# Patient Record
Sex: MALE | URBAN - METROPOLITAN AREA
[De-identification: names, ages, dates, MRNs, and addresses within clinical notes are randomized per-mention and may not be internally consistent; named-entity substitution may affect disease eponyms.]

---

## 2020-09-14 ENCOUNTER — APPOINTMENT (OUTPATIENT)
Dept: URBAN - METROPOLITAN AREA CLINIC 186 | Age: 25
Setting detail: DERMATOLOGY
End: 2020-09-14

## 2020-09-14 VITALS — TEMPERATURE: 97.8 F

## 2020-09-14 DIAGNOSIS — L64.8 OTHER ANDROGENIC ALOPECIA: ICD-10-CM

## 2020-09-14 DIAGNOSIS — R21 RASH AND OTHER NONSPECIFIC SKIN ERUPTION: ICD-10-CM

## 2020-09-14 PROCEDURE — 87220 TISSUE EXAM FOR FUNGI: CPT

## 2020-09-14 PROCEDURE — OTHER MIPS QUALITY: OTHER

## 2020-09-14 PROCEDURE — OTHER KOH PREP: OTHER

## 2020-09-14 PROCEDURE — OTHER COUNSELING: OTHER

## 2020-09-14 PROCEDURE — 99242 OFF/OP CONSLTJ NEW/EST SF 20: CPT

## 2020-09-14 PROCEDURE — OTHER DIAGNOSIS COMMENT: OTHER

## 2020-09-14 PROCEDURE — OTHER EDUCATIONAL RESOURCES PROVIDED: OTHER

## 2020-09-14 PROCEDURE — OTHER TREATMENT REGIMEN: OTHER

## 2020-09-14 PROCEDURE — OTHER ADDITIONAL NOTES: OTHER

## 2020-09-14 PROCEDURE — OTHER DEFER: OTHER

## 2020-09-14 ASSESSMENT — LOCATION SIMPLE DESCRIPTION DERM
LOCATION SIMPLE: LEFT FOREHEAD
LOCATION SIMPLE: RIGHT FOREHEAD
LOCATION SIMPLE: POSTERIOR SCALP
LOCATION SIMPLE: DORSAL PENIS

## 2020-09-14 ASSESSMENT — LOCATION DETAILED DESCRIPTION DERM
LOCATION DETAILED: POSTERIOR MID-PARIETAL SCALP
LOCATION DETAILED: RIGHT SUPERIOR LATERAL FOREHEAD
LOCATION DETAILED: LEFT SUPERIOR FOREHEAD
LOCATION DETAILED: RIGHT SUPERIOR FOREHEAD
LOCATION DETAILED: RIGHT LATERAL DISTAL DORSAL PENILE SHAFT
LOCATION DETAILED: LEFT SUPERIOR LATERAL FOREHEAD

## 2020-09-14 ASSESSMENT — LOCATION ZONE DERM
LOCATION ZONE: FACE
LOCATION ZONE: PENIS
LOCATION ZONE: SCALP

## 2020-09-14 ASSESSMENT — BSA RASH: BSA RASH: 1

## 2020-09-14 NOTE — HPI: RASH
What Type Of Note Output Would You Prefer (Optional)?: Bullet Format
How Severe Is Your Rash?: mild
Is This A New Presentation, Or A Follow-Up?: Referral for Rash
Additional History: Patient states his primary care physician prescribed anti fungals and antibiotics after doing a culture.
Who Is Your Referring Provider?: Dr. Padilla

## 2020-09-14 NOTE — PROCEDURE: TREATMENT REGIMEN
Plan: Denies family history of psoriasis, arthritis, he states he doesn’t have any affected areas on his elbows or knees. Patient has been using ketoconazole cream 2% , fluconazole 200 mg, Bactrim. He has finished his dosage of fluconazole and Bactrim. He does have triamcinolone 0.1% cream for his hands that he has been applying. Patient states his girlfriend did have a break out of molluscum contagiosum, denies any bumps. Recommended a punch biopsy to determine exactly what rash this is, but only if the area is flared for best path results.\\n\\nReassured patient this rash doesn’t look like a STD
Detail Level: Zone
Otc Regimen: Minoxidil
Continue Regimen: Finasteride
Discontinue Regimen: Ketoconazole 2% cream \\nTriamcinolone 0.1% cream
Initiate Treatment: Apply thin film of Cloderm twice daily for ten days, then use as maintenance once or twice weekly
Samples Given: Cloderm
Plan: Patient has only been taking Finasteride for 45 days, he has treated minoxidil and didn’t see a difference. Advised patient it takes up to 6 months to see results. Discussed low level light therapy vs PRP

## 2020-09-14 NOTE — PROCEDURE: KOH PREP
Showing: fungal hyphal elements: negative
Koh Procedure Text (Tissue Harvesting Technique): A glass slide was used to scrape the skin. The skin scrapings were placed on a glass slide, covered with a coverslip and a KOH solution was applied.
Koh Intro Text (From The.....): A KOH prep was ordered and evaluated from the
Detail Level: Zone

## 2020-09-14 NOTE — PROCEDURE: ADDITIONAL NOTES
Detail Level: Simple
Additional Notes: Referral correspondence to primary care physician, Dr ARCHIE NICOLE

## 2020-09-14 NOTE — PROCEDURE: DIAGNOSIS COMMENT
Comment: Beronica type 4.  He denies any family history of however has a typical male pattern I suggested compliance with 6 months of minoxidil  or finasteride or both.
Detail Level: Detailed
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  He denies any improvement with both topical and oral antifungals, Bactrim, or short court of over the counter hydrocortisone. \\nHe has no rashes elsewhere. Denies any over the counter meds . Ddx includes genital psoriasis, fixed drug reaction. He declined punch biopsy today.  I suggested treatment with low pendency topical steroid, cloderm cream samples were given.  He agrees if this is not improved will proceed with biopsy.

## 2020-10-12 ENCOUNTER — APPOINTMENT (OUTPATIENT)
Dept: URBAN - METROPOLITAN AREA CLINIC 186 | Age: 25
Setting detail: DERMATOLOGY
End: 2020-10-12

## 2021-08-23 ENCOUNTER — APPOINTMENT (OUTPATIENT)
Dept: URBAN - METROPOLITAN AREA CLINIC 186 | Age: 26
Setting detail: DERMATOLOGY
End: 2021-08-23

## 2021-08-23 DIAGNOSIS — R21 RASH AND OTHER NONSPECIFIC SKIN ERUPTION: ICD-10-CM

## 2021-08-23 PROCEDURE — OTHER PRESCRIPTION: OTHER

## 2021-08-23 PROCEDURE — OTHER ADDITIONAL NOTES: OTHER

## 2021-08-23 PROCEDURE — OTHER DIAGNOSIS COMMENT: OTHER

## 2021-08-23 PROCEDURE — OTHER PRESCRIPTION MEDICATION MANAGEMENT: OTHER

## 2021-08-23 PROCEDURE — OTHER DEFER: OTHER

## 2021-08-23 PROCEDURE — 11104 PUNCH BX SKIN SINGLE LESION: CPT

## 2021-08-23 PROCEDURE — OTHER EDUCATIONAL RESOURCES PROVIDED: OTHER

## 2021-08-23 PROCEDURE — OTHER COUNSELING: OTHER

## 2021-08-23 PROCEDURE — 99214 OFFICE O/P EST MOD 30 MIN: CPT | Mod: 25

## 2021-08-23 PROCEDURE — OTHER BIOPSY BY PUNCH METHOD: OTHER

## 2021-08-23 RX ORDER — CALCIPOTRIENE 50 UG/G
AEROSOL, FOAM TOPICAL
Qty: 60 | Refills: 0 | Status: ERX | COMMUNITY
Start: 2021-08-23

## 2021-08-23 RX ORDER — CLOBETASOL PROPIONATE 0.5 MG/G
CREAM TOPICAL
Qty: 30 | Refills: 2 | Status: ERX | COMMUNITY
Start: 2021-08-23

## 2021-08-23 ASSESSMENT — LOCATION SIMPLE DESCRIPTION DERM
LOCATION SIMPLE: LEFT INDEX FINGER
LOCATION SIMPLE: DORSAL PENIS
LOCATION SIMPLE: RIGHT HAND
LOCATION SIMPLE: LEFT HAND

## 2021-08-23 ASSESSMENT — LOCATION DETAILED DESCRIPTION DERM
LOCATION DETAILED: LEFT PROXIMAL DORSAL INDEX FINGER
LOCATION DETAILED: RIGHT ULNAR DORSAL HAND
LOCATION DETAILED: RIGHT LATERAL DISTAL DORSAL PENILE SHAFT
LOCATION DETAILED: LEFT ULNAR DORSAL HAND

## 2021-08-23 ASSESSMENT — LOCATION ZONE DERM
LOCATION ZONE: PENIS
LOCATION ZONE: FINGER
LOCATION ZONE: HAND

## 2021-08-23 NOTE — PROCEDURE: PRESCRIPTION MEDICATION MANAGEMENT
Samples Given: Sorilux foam
Detail Level: Zone
Initiate Treatment: Clobetasol cream
Discontinue Regimen: Cloderm cream (no relief)
Initiate Treatment: Sorilux foam twice daily
Render In Strict Bullet Format?: No
Discontinue Regimen: Triamcinolone cream

## 2021-08-23 NOTE — PROCEDURE: COUNSELING
Topical Steroid Recommendations: He was using over the counter hydrocortisone, I suggested he continue this as needed
Detail Level: Detailed

## 2021-08-23 NOTE — PROCEDURE: DIAGNOSIS COMMENT
Detail Level: Detailed
Render Risk Assessment In Note?: no
Comment: chronic papulosquamous patch, patient denies itch.  He doesnt recall any clearance with cloderm cream given at his last visit.  no rashes on scalp except for fine papules on hands.

## 2021-08-23 NOTE — PROCEDURE: DEFER
Introduction Text (Please End With A Colon): The following procedure has been deferred:
Procedure To Be Performed At Next Visit: Biopsy by punch method
Detail Level: Detailed

## 2021-08-23 NOTE — PROCEDURE: ADDITIONAL NOTES
Additional Notes: Patient states the bumps on his hands come and go. He saw his primary care physician and was given triamcinolone cream to treat with no success. Rash reacts when in the sun, it is not spreading up the arms. Frequently wears gloves for work. Discussed performing biopsy by punch method.
Detail Level: Simple
Render Risk Assessment In Note?: no

## 2021-08-23 NOTE — HPI: BUMPS
How Severe Are Your Bumps?: mild
Have Your Bumps Been Treated?: not been treated
Is This A New Presentation, Or A Follow-Up?: Bumps
Additional History: Primary care physician gave triamcinolone twice daily, on and off x 1 year, no real improvement, works but bumps come back. Denies any history of eczema or psoriasis/denies family history, denies travel/new products, states sometimes girlfriend will get bumps on her hands also

## 2021-08-27 ENCOUNTER — RX ONLY (RX ONLY)
Age: 26
End: 2021-08-27

## 2021-08-27 RX ORDER — DESONIDE 0.5 MG/G
CREAM TOPICAL
Qty: 15 | Refills: 0 | Status: ERX | COMMUNITY
Start: 2021-08-27

## 2021-10-12 ENCOUNTER — APPOINTMENT (OUTPATIENT)
Dept: URBAN - METROPOLITAN AREA CLINIC 186 | Age: 26
Setting detail: DERMATOLOGY
End: 2021-10-12

## 2021-10-12 DIAGNOSIS — R21 RASH AND OTHER NONSPECIFIC SKIN ERUPTION: ICD-10-CM

## 2021-10-12 PROCEDURE — 99212 OFFICE O/P EST SF 10 MIN: CPT

## 2021-10-12 PROCEDURE — OTHER PRESCRIPTION MEDICATION MANAGEMENT: OTHER

## 2021-10-12 PROCEDURE — OTHER ADDITIONAL NOTES: OTHER

## 2021-10-12 PROCEDURE — OTHER DIAGNOSIS COMMENT: OTHER

## 2021-10-12 PROCEDURE — OTHER COUNSELING: OTHER

## 2021-10-12 ASSESSMENT — LOCATION DETAILED DESCRIPTION DERM: LOCATION DETAILED: RIGHT LATERAL DISTAL DORSAL PENILE SHAFT

## 2021-10-12 ASSESSMENT — LOCATION SIMPLE DESCRIPTION DERM: LOCATION SIMPLE: DORSAL PENIS

## 2021-10-12 ASSESSMENT — LOCATION ZONE DERM: LOCATION ZONE: PENIS

## 2021-10-12 NOTE — PROCEDURE: PRESCRIPTION MEDICATION MANAGEMENT
Render In Strict Bullet Format?: No
Discontinue Regimen: Sorilux foam twice daily (caused severe burning sensation, patient saw primary care physician and was told to discontinued)
Plan: Previous note states patient did not get relief with Cloderm cream, this was a miscommunication. Patient did find Cloderm cream helpful.
Detail Level: Zone

## 2021-10-12 NOTE — PROCEDURE: ADDITIONAL NOTES
Detail Level: Simple
Render Risk Assessment In Note?: no
Additional Notes: Discussed performing biopsy by punch method. Patient is hesitant to proceed with biopsy. Advised patient if he follows the wound care instructions he should not have a scar.\\nAdvised patient to call and schedule an appointment if it flares again.

## 2021-10-12 NOTE — PROCEDURE: DIAGNOSIS COMMENT
Comment: No active rash.  I suggested he wait  for resolution.  reassurance.\\nif recurs he was told to consider topical steroid creams as he was irritated with sorilux foam.  reconsider punch bx as I dont have a dx.
Render Risk Assessment In Note?: no
Detail Level: Detailed